# Patient Record
Sex: MALE | Race: WHITE
[De-identification: names, ages, dates, MRNs, and addresses within clinical notes are randomized per-mention and may not be internally consistent; named-entity substitution may affect disease eponyms.]

---

## 2023-04-16 VITALS — SYSTOLIC BLOOD PRESSURE: 119 MMHG | DIASTOLIC BLOOD PRESSURE: 57 MMHG

## 2023-04-17 ENCOUNTER — HOSPITAL ENCOUNTER (INPATIENT)
Dept: HOSPITAL 26 - MED | Age: 63
LOS: 3 days | Discharge: ANOTHER HOSPITAL | DRG: 291 | End: 2023-04-20
Attending: FAMILY MEDICINE | Admitting: FAMILY MEDICINE
Payer: MEDICARE

## 2023-04-17 VITALS — SYSTOLIC BLOOD PRESSURE: 143 MMHG | DIASTOLIC BLOOD PRESSURE: 76 MMHG

## 2023-04-17 VITALS — WEIGHT: 275 LBS | HEIGHT: 65 IN | BODY MASS INDEX: 45.82 KG/M2

## 2023-04-17 VITALS — DIASTOLIC BLOOD PRESSURE: 63 MMHG | SYSTOLIC BLOOD PRESSURE: 123 MMHG

## 2023-04-17 DIAGNOSIS — M10.9: ICD-10-CM

## 2023-04-17 DIAGNOSIS — J44.9: ICD-10-CM

## 2023-04-17 DIAGNOSIS — D72.0: ICD-10-CM

## 2023-04-17 DIAGNOSIS — E11.65: ICD-10-CM

## 2023-04-17 DIAGNOSIS — D72.829: ICD-10-CM

## 2023-04-17 DIAGNOSIS — Z20.822: ICD-10-CM

## 2023-04-17 DIAGNOSIS — E83.51: ICD-10-CM

## 2023-04-17 DIAGNOSIS — I50.33: ICD-10-CM

## 2023-04-17 DIAGNOSIS — J96.00: ICD-10-CM

## 2023-04-17 DIAGNOSIS — E83.39: ICD-10-CM

## 2023-04-17 DIAGNOSIS — E66.01: ICD-10-CM

## 2023-04-17 DIAGNOSIS — D63.1: ICD-10-CM

## 2023-04-17 DIAGNOSIS — E87.6: ICD-10-CM

## 2023-04-17 DIAGNOSIS — I13.2: Primary | ICD-10-CM

## 2023-04-17 DIAGNOSIS — I48.92: ICD-10-CM

## 2023-04-17 DIAGNOSIS — E87.1: ICD-10-CM

## 2023-04-17 DIAGNOSIS — Z91.040: ICD-10-CM

## 2023-04-17 DIAGNOSIS — I48.91: ICD-10-CM

## 2023-04-17 DIAGNOSIS — I25.10: ICD-10-CM

## 2023-04-17 DIAGNOSIS — Z88.6: ICD-10-CM

## 2023-04-17 DIAGNOSIS — E88.09: ICD-10-CM

## 2023-04-17 DIAGNOSIS — E11.22: ICD-10-CM

## 2023-04-17 DIAGNOSIS — N18.6: ICD-10-CM

## 2023-04-17 LAB
ALBUMIN FLD-MCNC: 2.5 G/DL (ref 3.4–5)
ANION GAP SERPL CALCULATED.3IONS-SCNC: 12.7 MMOL/L (ref 8–16)
AST SERPL-CCNC: 16 U/L (ref 15–37)
BASOPHILS # BLD AUTO: 0 K/UL (ref 0–0.22)
BASOPHILS NFR BLD AUTO: 0.3 % (ref 0–2)
BILIRUB SERPL-MCNC: 0.4 MG/DL (ref 0–1)
BUN SERPL-MCNC: 54 MG/DL (ref 7–18)
CHLORIDE SERPL-SCNC: 103 MMOL/L (ref 98–107)
CO2 SERPL-SCNC: 25.1 MMOL/L (ref 21–32)
CREAT SERPL-MCNC: 3 MG/DL (ref 0.6–1.3)
EOSINOPHIL # BLD AUTO: 0.2 K/UL (ref 0–0.4)
EOSINOPHIL NFR BLD AUTO: 2.4 % (ref 0–4)
ERYTHROCYTE [DISTWIDTH] IN BLOOD BY AUTOMATED COUNT: 17.9 % (ref 11.6–13.7)
GFR SERPL CREATININE-BSD FRML MDRD: 27 ML/MIN (ref 90–?)
GLUCOSE SERPL-MCNC: 89 MG/DL (ref 74–106)
HCT VFR BLD AUTO: 31.4 % (ref 36–52)
HGB BLD-MCNC: 10 G/DL (ref 12–18)
LYMPHOCYTES # BLD AUTO: 0.3 K/UL (ref 2–11.5)
LYMPHOCYTES NFR BLD AUTO: 3.1 % (ref 20.5–51.1)
MCH RBC QN AUTO: 30 PG (ref 27–31)
MCHC RBC AUTO-ENTMCNC: 32 G/DL (ref 33–37)
MCV RBC AUTO: 92.6 FL (ref 80–94)
MONOCYTES # BLD AUTO: 0.6 K/UL (ref 0.8–1)
MONOCYTES NFR BLD AUTO: 5.4 % (ref 1.7–9.3)
NEUTROPHILS # BLD AUTO: 9.1 K/UL (ref 1.8–7.7)
NEUTROPHILS NFR BLD AUTO: 88.8 % (ref 42.2–75.2)
PLATELET # BLD AUTO: 242 K/UL (ref 140–450)
POTASSIUM SERPL-SCNC: 2.8 MMOL/L (ref 3.5–5.1)
PROTHROMBIN TIME: 10.8 SECS (ref 10.8–13.4)
RBC # BLD AUTO: 3.39 MIL/UL (ref 4.2–6.1)
SODIUM SERPL-SCNC: 138 MMOL/L (ref 136–145)
WBC # BLD AUTO: 10.2 K/UL (ref 4.8–10.8)

## 2023-04-17 PROCEDURE — 05HY33Z INSERTION OF INFUSION DEVICE INTO UPPER VEIN, PERCUTANEOUS APPROACH: ICD-10-PCS | Performed by: STUDENT IN AN ORGANIZED HEALTH CARE EDUCATION/TRAINING PROGRAM

## 2023-04-17 NOTE — NUR
2100 DR APONTE ASKED ME TO TAKE PT OFF BIPAP. PLACED PT ON 2LNC. SATS DROPPED. 
PLACED PT ON 6L FACE MASK. SATS IMPROVED BUT PT STATED HE FELT SOB. DR APONTE 
SAID TO PLCE PT BACK ON BIPAP. SAME SETTINGS.

## 2023-04-17 NOTE — NUR
BED HAS NO WORKING SCALE


-------------------------------------------------------------------------------

Addendum: 04/18/23 at 0737 by Agency 08 RN RN

-------------------------------------------------------------------------------

Amended: Links added.

## 2023-04-17 NOTE — NUR
[facility] ER NURSE PER TRANSFER REPORT@ 2300 VIA PHONE. REPORTED:54 YO MALE WITH SOB DURING 
HEMODIALYSIS. DX: SOB AND PULMONARY EDEMA. HX: COPD, DM, HTN, KIDNEY DISEASE. ADMIT TO TELE 
PER MD [facility]. SINUS TACH 120'S -130'S, ASYMPTOMATIC, MD AWARE. BIPAP WITH ATTEMPT TO 
TRANSFER TO NASAL CANNULA 6L. PT DESATTED AND SOB. BIPAP MAINTAINED. SETTINGS: RATE 16, FI02 
45, IPAP 14, EPAP 7. HD MONDAY AND FRIDAY WITH RIGHT UPPER CHEST ACCESS. GENNA PICC PLACED, 
CONFIRMED AND SL. K=2.8  AND 1ST TROPONIN=25 AND 2ND TROP PENDING, MD AWARE. SKIN INTACT. 
NON PITTING EDEMA 2+ BILAT LOWER EXTREMITIES. DENIES PAIN. 

RECEIVED AT THIS TIME VIA GURNEY FROM ER WITH RESPIRATORY ACCOMPANYING ER TRANSPORT, BIPAP 
MAINTAINED. VS 97.1-65-/57 98% ON BIPAP. PT RECEIVED AS REPORTED. RELAXED, A/0X4. "CAN 
SOMEONE LET MY  KNOW I AM HERE." VERIFIED ALLERGIES, LATEX AND NSAIDS. CONT TO 
MONITOR.

## 2023-04-17 NOTE — NUR
231 TRANSPORTED PT ON BIPAP TO ROOM 118. PT IS ON IPAP 14 EPAP 7 RR 16 FIO2 45%. PT 
UNDERSTANDS TO KEEP MASK ON.

## 2023-04-18 VITALS — SYSTOLIC BLOOD PRESSURE: 106 MMHG | DIASTOLIC BLOOD PRESSURE: 67 MMHG

## 2023-04-18 VITALS — SYSTOLIC BLOOD PRESSURE: 105 MMHG | DIASTOLIC BLOOD PRESSURE: 45 MMHG

## 2023-04-18 VITALS — SYSTOLIC BLOOD PRESSURE: 128 MMHG | DIASTOLIC BLOOD PRESSURE: 62 MMHG

## 2023-04-18 VITALS — SYSTOLIC BLOOD PRESSURE: 115 MMHG | DIASTOLIC BLOOD PRESSURE: 56 MMHG

## 2023-04-18 VITALS — DIASTOLIC BLOOD PRESSURE: 59 MMHG | SYSTOLIC BLOOD PRESSURE: 130 MMHG

## 2023-04-18 LAB
AMYLASE SERPL-CCNC: 12 U/L (ref 25–115)
ANION GAP SERPL CALCULATED.3IONS-SCNC: 14.6 MMOL/L (ref 8–16)
BASOPHILS # BLD AUTO: 0 K/UL (ref 0–0.22)
BASOPHILS NFR BLD AUTO: 0.2 % (ref 0–2)
BUN SERPL-MCNC: 55 MG/DL (ref 7–18)
CHLORIDE SERPL-SCNC: 102 MMOL/L (ref 98–107)
CHOLEST/HDLC SERPL: 1.3 {RATIO} (ref 1–4.5)
CO2 SERPL-SCNC: 23.5 MMOL/L (ref 21–32)
CREAT SERPL-MCNC: 3.3 MG/DL (ref 0.6–1.3)
EOSINOPHIL # BLD AUTO: 0.3 K/UL (ref 0–0.4)
EOSINOPHIL NFR BLD AUTO: 1.9 % (ref 0–4)
ERYTHROCYTE [DISTWIDTH] IN BLOOD BY AUTOMATED COUNT: 17.6 % (ref 11.6–13.7)
GFR SERPL CREATININE-BSD FRML MDRD: 24 ML/MIN (ref 90–?)
GLUCOSE SERPL-MCNC: 151 MG/DL (ref 74–106)
HCT VFR BLD AUTO: 28.8 % (ref 36–52)
HDLC SERPL-MCNC: 55 MG/DL (ref 40–60)
HGB BLD-MCNC: 9.2 G/DL (ref 12–18)
LDLC SERPL CALC-MCNC: 10 MG/DL (ref 60–100)
LIPASE SERPL-CCNC: 90 U/L (ref 73–393)
LYMPHOCYTES # BLD AUTO: 1.3 K/UL (ref 2–11.5)
LYMPHOCYTES NFR BLD AUTO: 7.9 % (ref 20.5–51.1)
MAGNESIUM SERPL-MCNC: 1.9 MG/DL (ref 1.8–2.4)
MCH RBC QN AUTO: 30 PG (ref 27–31)
MCHC RBC AUTO-ENTMCNC: 32 G/DL (ref 33–37)
MCV RBC AUTO: 93.6 FL (ref 80–94)
MONOCYTES # BLD AUTO: 1.7 K/UL (ref 0.8–1)
MONOCYTES NFR BLD AUTO: 10.2 % (ref 1.7–9.3)
NEUTROPHILS # BLD AUTO: 13.5 K/UL (ref 1.8–7.7)
NEUTROPHILS NFR BLD AUTO: 79.8 % (ref 42.2–75.2)
PHOSPHATE SERPL-MCNC: 2 MG/DL (ref 2.5–4.9)
PLATELET # BLD AUTO: 237 K/UL (ref 140–450)
POTASSIUM SERPL-SCNC: 4.1 MMOL/L (ref 3.5–5.1)
RBC # BLD AUTO: 3.08 MIL/UL (ref 4.2–6.1)
SODIUM SERPL-SCNC: 136 MMOL/L (ref 136–145)
T4 FREE SERPL-MCNC: 1.18 NG/DL (ref 0.76–1.46)
TRIGL SERPL-MCNC: 36 MG/DL (ref 30–150)
TSH SERPL DL<=0.05 MIU/L-ACNC: 2.78 UIU/ML (ref 0.34–3.74)
WBC # BLD AUTO: 16.9 K/UL (ref 4.8–10.8)

## 2023-04-18 PROCEDURE — 5A09357 ASSISTANCE WITH RESPIRATORY VENTILATION, LESS THAN 24 CONSECUTIVE HOURS, CONTINUOUS POSITIVE AIRWAY PRESSURE: ICD-10-PCS | Performed by: STUDENT IN AN ORGANIZED HEALTH CARE EDUCATION/TRAINING PROGRAM

## 2023-04-18 PROCEDURE — 5A1D70Z PERFORMANCE OF URINARY FILTRATION, INTERMITTENT, LESS THAN 6 HOURS PER DAY: ICD-10-PCS | Performed by: STUDENT IN AN ORGANIZED HEALTH CARE EDUCATION/TRAINING PROGRAM

## 2023-04-18 RX ADMIN — POTASSIUM & SODIUM PHOSPHATES POWDER PACK 280-160-250 MG SCH PKT: 280-160-250 PACK at 13:29

## 2023-04-18 RX ADMIN — POTASSIUM & SODIUM PHOSPHATES POWDER PACK 280-160-250 MG SCH PKT: 280-160-250 PACK at 17:32

## 2023-04-18 RX ADMIN — PANTOPRAZOLE SODIUM SCH MG: 40 TABLET, DELAYED RELEASE ORAL at 09:29

## 2023-04-18 NOTE — NUR
SCHEDULED AND PRESCRIBED MEDICATION WAS GIVEN TO PT AS PER MD ORDER. ALL SAFETY PRECAUTION 
IMPLEMENTED. BED IN LOW POSITION, BED WHEELS ON LOCK AND CALL LIGHT WITHIN REACH.

## 2023-04-18 NOTE — NUR
RECEIVED PT FROM MORNING SHIFT NURSE. PT IS AOX4, AMBULATORY WITH WALKER AND ASSIST, ABLE TO 
VERBALIZE NEEDS AND ABLE TO FOLLOW COMMANDS. PT HAS 6L NC AND ON REGULAR DIET. PT HAS PIC 
LINE ON RIGHT UPPER ARM DOUBLE LUMEN SALINE LOCK AND PT HAS DIALYSIS ACCESS ON RIGHT UPPER 
CHEST. PT SKIN IS INTACT. NO COMPLAIN OF PAIN AT THIS TIME ON S/S OF RESPIRATORY DISTRESS 
NOTED. ALL SAFETY PRECAUTION IMPLEMENTED. BED IN LOW POSITION, BED WHEELS ON LOCK AND CALL 
LIGHT WITHIN REACH.

## 2023-04-18 NOTE — NUR
PT DONE WITH DIALYSIS, 2.1L OUT. PT VITALS STABLE, REPORTS FEELING BETTER. RT WILL BE 
CONTACTED TO ASSESS PT BREATHING IN HOPES TO CHANGE PT FROM BIPAP TO CPAP OR HIGHFLOW O2.

## 2023-04-18 NOTE — NUR
RECEIVED REPORT FROM NIGHTSHIFT NURSE. PT IS ASLEEP IN BED, WOKE TO NAME AND TOUCH. NO SIGNS 
OF DISTRESS, NO REPORTS OF PAIN/DISCOMFORT. PT ON BIPAP. DOUBLE LUMEN PICC LINE TO RIGHT 
UPPER ARM, DIALYSIS CATH IN RIGHT UPPER CHEST, BOTH CLEAN AND INTACT. NO FURTHER NEEDS ARE 
TO BE MET AT THIS TIME, WILL CONTINUE WITH PATIENT CARE.



REORIENTED PT TO CALL LIGHT. BED IN LOWEST POSITION, 2 SIDE RAILS UP, CALL LIGHT PLACED 
WITHIN REACH.

## 2023-04-18 NOTE — NUR
MD NOTIFIED OF CARDIAC RHYTHM CHANGE FROM ST TO UNCONTROLLED AFIB/AFLUTTER 107/120'S. 
REMAINS ASYMPTOMATIC. MRSA SCREEN COLLECTED.  PHONED AND SPOKE WITH PT UPON ARRIVAL 
TO FLOOR.

## 2023-04-18 NOTE — NUR
PT CHANGED FROM BIPAP TO NC @1530 WITH RT. 



ENDORSED PT TO NIGHTSHIFT NURSE FOR CONTINUITY OF CARE. PT IS AWAKE, STABLE, NO SIGNS OF 
DISTRESS, NO REPORTS OF PAIN.  AT BEDSIDE. NO FURTHER NEEDS ARE TO BE MET AT THIS 
TIME. BED IN LOWEST POSITION, 2 SIDE RAILS UP, CALL LIGHT PLACED WITHIN REACH.

## 2023-04-18 NOTE — NUR
RN CALLED STATED PT IS REQUESTING TO COME OFF BIPAP HE GOT DIALYSIS AND FEELS BETTER. RT 
ASSESSED PT AND PUT PT ON 6L  GREEN BUBBLER NC. HE IS HAVING NO DISTRESS NOTED. HR 87 SPO2 
95%. WILL CONTINUE TO MONITOR AND PT IS REQUESTING TO USE CPAP AT NIGHT LIKE HE DOES AT 
HOME.  CPAP OF 14. HE SAYS IT IS EASIER THAN BIPAP. WILL GIVE DURING REPORT.

## 2023-04-18 NOTE — NUR
PATIENT HAS BEEN SCREENED AND CATEGORIZED AS MODERATE NUTRITION RISK. PATIENT WILL BE SEEN 
WITHIN 3-5 DAYS OF ADMISSION.



4/21/23-4/23/23



REVIEWED BY DALE DRIVER RD


-------------------------------------------------------------------------------

Addendum: 04/18/23 at 1254 by Rolando Pino RD

-------------------------------------------------------------------------------

[facility] CONSULT RECEIVED ON 4/18/23 FOR UNCONTROLLED DIABETES, RENAL DIET . CONSULT DOES NOT 
MEET HIGH RISK CRITERIA PER HOSPITAL POLICY. PT WILL BE SEEN AND ASSESSED ACCORDING TO THE 
NUTRITION CARE POLICY.



REVIEWED BY MARANDA ROBLES RD

## 2023-04-18 NOTE — NUR
PLACED ON CPAP PER PT REQUEST, PT STATES HE USES A CPAP OF 14 AT HOME AT NIGHT. PT IS ON 
CPAP OF 14 AND 45% FIO2. NO DISTRESS NOTED, SPO2 99%, WILL CONTINUE TO MONITOR.

## 2023-04-18 NOTE — NUR
HAND-OFF REPORT TO JAIMIE DIAMOND AM ON-COMING NURSE FOR CONTINUITY OF CARE. PT REMAINS SAME. 
ON BIPAP WITH NO CHANGE IN SETTINGS. TOLERATING WELL. CONTINUES UNCONTROLLED AFIB/AFLUTTER 
ON CARDIAC MONITOR, ASYMPTOMATIC. NO RESPIRATORY DISTRESS. CONTINUES TO DENY PAIN OR ANY 
DISCOMFORT. GENNA PICC LINE DOUBLE LUMEN PATENT WITH A.M LABS BLOOD DRAW TAKEN FROM LINE. 
RELINQUISHED CARE OF PT AT THIS TIME.

## 2023-04-18 NOTE — NUR
DC PLANNING 



*** ASSESSMENT COMPLETE***



PLEASE REFER TO ASSESSMENT FOR ADDITIONAL DETAILS 





TENTATIVE DC PLAN IS FOR PT TO RETURN HOME, WITH PARTNER PROVIDING TRANSPORTATION, WHEN 
MEDICALLY STABLE. SW SPOKE TO PT ABOUT [facility] TRANSFER, HOWEVER, REPORTED THAT CM WOULD 
NOTIFY HIM OF OPEN BEDS OR WHEN [place] HAS ACCEPTED. PT VERBALIZED UNDERSTANDING AND REPORTS 
HE UNDERSTAND PROCESS. 

-------------------------------------------------------------------------------

Addendum: 04/19/23 at 0845 by Shikha DING

-------------------------------------------------------------------------------

Amended: Links added.

## 2023-04-18 NOTE — NUR
OBTAINED ORDER FROM MD FOR DIALYSIS TODAY. OBTAINED CONSENT FROM PT AND MD. PT WITH DIALYSIS 
NURSE AT BEDSIDE. VITAL SIGNS ARE STABLE, PT STARTED DIALYSIS @ 1226. WILL CONTINUE WITH 
CARE.

## 2023-04-19 VITALS — SYSTOLIC BLOOD PRESSURE: 163 MMHG | DIASTOLIC BLOOD PRESSURE: 80 MMHG

## 2023-04-19 VITALS — SYSTOLIC BLOOD PRESSURE: 125 MMHG | DIASTOLIC BLOOD PRESSURE: 50 MMHG

## 2023-04-19 VITALS — SYSTOLIC BLOOD PRESSURE: 103 MMHG | DIASTOLIC BLOOD PRESSURE: 46 MMHG

## 2023-04-19 VITALS — SYSTOLIC BLOOD PRESSURE: 126 MMHG | DIASTOLIC BLOOD PRESSURE: 69 MMHG

## 2023-04-19 VITALS — SYSTOLIC BLOOD PRESSURE: 130 MMHG | DIASTOLIC BLOOD PRESSURE: 59 MMHG

## 2023-04-19 LAB
ANION GAP SERPL CALCULATED.3IONS-SCNC: 13.3 MMOL/L (ref 8–16)
BASOPHILS # BLD AUTO: 0.1 K/UL (ref 0–0.22)
BASOPHILS NFR BLD AUTO: 0.6 % (ref 0–2)
BUN SERPL-MCNC: 47 MG/DL (ref 7–18)
CHLORIDE SERPL-SCNC: 100 MMOL/L (ref 98–107)
CO2 SERPL-SCNC: 23.7 MMOL/L (ref 21–32)
CREAT SERPL-MCNC: 3.6 MG/DL (ref 0.6–1.3)
EOSINOPHIL # BLD AUTO: 0.5 K/UL (ref 0–0.4)
EOSINOPHIL NFR BLD AUTO: 4.3 % (ref 0–4)
ERYTHROCYTE [DISTWIDTH] IN BLOOD BY AUTOMATED COUNT: 18.1 % (ref 11.6–13.7)
GFR SERPL CREATININE-BSD FRML MDRD: 22 ML/MIN (ref 90–?)
GLUCOSE SERPL-MCNC: 330 MG/DL (ref 74–106)
HCT VFR BLD AUTO: 27.6 % (ref 36–52)
HGB BLD-MCNC: 8.9 G/DL (ref 12–18)
LYMPHOCYTES # BLD AUTO: 1.2 K/UL (ref 2–11.5)
LYMPHOCYTES NFR BLD AUTO: 11.5 % (ref 20.5–51.1)
MCH RBC QN AUTO: 30 PG (ref 27–31)
MCHC RBC AUTO-ENTMCNC: 32 G/DL (ref 33–37)
MCV RBC AUTO: 93.4 FL (ref 80–94)
MONOCYTES # BLD AUTO: 1.1 K/UL (ref 0.8–1)
MONOCYTES NFR BLD AUTO: 10.8 % (ref 1.7–9.3)
NEUTROPHILS # BLD AUTO: 7.7 K/UL (ref 1.8–7.7)
NEUTROPHILS NFR BLD AUTO: 72.8 % (ref 42.2–75.2)
PLATELET # BLD AUTO: 263 K/UL (ref 140–450)
POTASSIUM SERPL-SCNC: 4 MMOL/L (ref 3.5–5.1)
RBC # BLD AUTO: 2.95 MIL/UL (ref 4.2–6.1)
SODIUM SERPL-SCNC: 133 MMOL/L (ref 136–145)
T3RU NFR SERPL: 30 % (ref 24–39)
T4 SERPL-MCNC: 7.5 UG/DL (ref 4.5–12)
WBC # BLD AUTO: 10.6 K/UL (ref 4.8–10.8)

## 2023-04-19 RX ADMIN — POTASSIUM & SODIUM PHOSPHATES POWDER PACK 280-160-250 MG SCH PKT: 280-160-250 PACK at 17:44

## 2023-04-19 RX ADMIN — POTASSIUM & SODIUM PHOSPHATES POWDER PACK 280-160-250 MG SCH PKT: 280-160-250 PACK at 09:48

## 2023-04-19 RX ADMIN — INSULIN LISPRO PRN UNITS: 100 INJECTION, SOLUTION INTRAVENOUS; SUBCUTANEOUS at 17:45

## 2023-04-19 RX ADMIN — PANTOPRAZOLE SODIUM SCH MG: 40 TABLET, DELAYED RELEASE ORAL at 09:47

## 2023-04-19 RX ADMIN — INSULIN LISPRO PRN UNITS: 100 INJECTION, SOLUTION INTRAVENOUS; SUBCUTANEOUS at 13:29

## 2023-04-19 RX ADMIN — ASPIRIN TAB DELAYED RELEASE 81 MG SCH MG: 81 TABLET DELAYED RESPONSE at 09:48

## 2023-04-19 RX ADMIN — POTASSIUM & SODIUM PHOSPHATES POWDER PACK 280-160-250 MG SCH PKT: 280-160-250 PACK at 13:26

## 2023-04-19 RX ADMIN — Medication SCH MG: at 17:43

## 2023-04-19 RX ADMIN — Medication SCH DEV: at 16:30

## 2023-04-19 RX ADMIN — Medication SCH DEV: at 11:30

## 2023-04-19 RX ADMIN — ATORVASTATIN CALCIUM SCH MG: 80 TABLET, FILM COATED ORAL at 09:48

## 2023-04-19 RX ADMIN — Medication SCH MG: at 09:50

## 2023-04-19 RX ADMIN — Medication SCH DEV: at 21:00

## 2023-04-19 NOTE — NUR
DC PLANNING:

CM CALLED [place]  SPOKE WITH LARRY STATED THEY RECEIVED THE ORDER FOR STABLE 
FOR TRANSFER AND JAIMIE IS THE CM CURRENTLY WORKING ON TRANSFERRING TO [facility]. CM 
TO FOLLOW

## 2023-04-19 NOTE — NUR
CHECKED THE PT, STILL ON SLEEP. CHEST RISE AND FALL SYMMETRICALLY NOTED. RESPIRATION IS EVEN 
AND UNLABORED. ALL SAFETY PRECAUTION IMPLEMENTED. BED IN LOW POSITION, BED WHEELS ON LOCK 
AND CALL LIGHT WITHIN REACH.

## 2023-04-19 NOTE — NUR
NURSING PM NARRATIVE (OPENING)

1930 HAND-OFF REPORT RECEIVED FROM PRABHAKAR AIKEN. ENDORSED: NO HD TODAY. CATHETER 
CLOGGED. ALTEPASE GIVEN BY HD NURSE. TRY AGAIN TO ACCESS TOMORROW. PT RECEIVED RESTINIG IN 
BED WITHOUT C/O. O2 6L/NC AND MAY HAVE CPAP AT NIGHT PRN. POSS D/C TOMORROW. DELIO PT.

PLAN: MEDS AS SCHEDULED AND COMFORT MEASURES.

## 2023-04-19 NOTE — NUR
RECEIVED ON SUPPLEMENTAL OXYGEN AT 6 LPM VIA CURAPLEX NC/BUBBLE HUMIDIFIER SATURATION 100%; 
TITRATED FIO2 TO 4 LPM; PRABHAKAR/BARRY RN AT BEDSIDE AND AWARE; RCP TO MONITOR AND TITRATED 
FIO2 AS TOLERATED

## 2023-04-19 NOTE — NUR
RECEIVED REPORT FROM NIGHT SHIFT NURSE. PATIENT LYING DOWN IN BED, ON CPAP MACHINE, 
SLEEPING, AROUSABLE BY VOICE. NO DISTRESS NOTED. GENNA PICC LINE INTACT, ON SALINE LOCK. SKIN 
INTACT. REVIEWED PLAN OF CARE WITH PATIENT. VERBALIZED UNDERSTANDING. SAFETY MEASURES IN 
PLACE, CALL LIGHT WITHIN REACH. WILL CONTINUE TO MONITOR.

## 2023-04-19 NOTE — NUR
REMOVED FROM CPAP TO MASK AND PLACED ON SUPPLEMENTAL OXYGEN AT 6 LPM VIA NC BY PRABHAKAR/BARRY DIAMOND

## 2023-04-19 NOTE — NUR
PT IS ON SLEEP. CHEST RISE AND FALL SYMMETRICALLY NOTED. RESPIRATION IS EVEN AND UNLABORED. 
ALL SAFETY AND SEIZURE PRECAUTION IMPLEMENTED. BED IN LOW POSITION, BED WHEELS ON LOCK AND 
CALL LIGHT WITHIN REACH.

## 2023-04-19 NOTE — NUR
HD NURSE AT BEDSIDE, SAYS "RIGHT TUNNEL CATHETER IS CLOGGED." HD NURSE TALKED TO DR. LOPES, PER MD GIVE CATH FLOW X2 TO EACH LUMEN CATHETER. CATH FLOW GIVEN AS PER MD ORDER. 
HD NURSE WILL COME BACK TOMORROW TO REASSESS THE HD SITE.

## 2023-04-19 NOTE — NUR
PT WAS GIVEN WARM BLANKET AS PER PT REQUEST. PT DENIES PAIN. NO S/S OF RESPIRATORY DISTRESS 
NOTED. ALL SAFETY MEASURES IMPLEMENTED. BED IN LOW POSITION, BED WHEELS ON LOCK AND CALL 
LIGHT WITHIN REACH.

## 2023-04-20 VITALS — DIASTOLIC BLOOD PRESSURE: 98 MMHG | SYSTOLIC BLOOD PRESSURE: 138 MMHG

## 2023-04-20 VITALS — SYSTOLIC BLOOD PRESSURE: 144 MMHG | DIASTOLIC BLOOD PRESSURE: 64 MMHG

## 2023-04-20 VITALS — DIASTOLIC BLOOD PRESSURE: 64 MMHG | SYSTOLIC BLOOD PRESSURE: 144 MMHG

## 2023-04-20 VITALS — SYSTOLIC BLOOD PRESSURE: 123 MMHG | DIASTOLIC BLOOD PRESSURE: 81 MMHG

## 2023-04-20 VITALS — SYSTOLIC BLOOD PRESSURE: 163 MMHG | DIASTOLIC BLOOD PRESSURE: 80 MMHG

## 2023-04-20 VITALS — DIASTOLIC BLOOD PRESSURE: 71 MMHG | SYSTOLIC BLOOD PRESSURE: 119 MMHG

## 2023-04-20 VITALS — SYSTOLIC BLOOD PRESSURE: 145 MMHG | DIASTOLIC BLOOD PRESSURE: 66 MMHG

## 2023-04-20 VITALS — DIASTOLIC BLOOD PRESSURE: 57 MMHG | SYSTOLIC BLOOD PRESSURE: 126 MMHG

## 2023-04-20 LAB
ANION GAP SERPL CALCULATED.3IONS-SCNC: 14.8 MMOL/L (ref 8–16)
APPEARANCE UR: CLEAR
BARBITURATES UR QL SCN: NEGATIVE NG/ML
BASOPHILS # BLD AUTO: 0.1 K/UL (ref 0–0.22)
BASOPHILS NFR BLD AUTO: 0.5 % (ref 0–2)
BENZODIAZ UR QL SCN: NEGATIVE NG/ML
BILIRUB UR QL STRIP: (no result)
BUN SERPL-MCNC: 59 MG/DL (ref 7–18)
BZE UR QL SCN: NEGATIVE NG/ML
CANNABINOIDS UR QL SCN: NEGATIVE NG/ML
CHLORIDE SERPL-SCNC: 98 MMOL/L (ref 98–107)
CO2 SERPL-SCNC: 22.9 MMOL/L (ref 21–32)
COLOR UR: YELLOW
CREAT SERPL-MCNC: 3.8 MG/DL (ref 0.6–1.3)
EOSINOPHIL # BLD AUTO: 0.4 K/UL (ref 0–0.4)
EOSINOPHIL NFR BLD AUTO: 4.2 % (ref 0–4)
ERYTHROCYTE [DISTWIDTH] IN BLOOD BY AUTOMATED COUNT: 17.7 % (ref 11.6–13.7)
GFR SERPL CREATININE-BSD FRML MDRD: 21 ML/MIN (ref 90–?)
GLUCOSE SERPL-MCNC: 294 MG/DL (ref 74–106)
GLUCOSE UR STRIP-MCNC: (no result) MG/DL
HCT VFR BLD AUTO: 26.8 % (ref 36–52)
HGB BLD-MCNC: 8.8 G/DL (ref 12–18)
HGB UR QL STRIP: NEGATIVE
LEUKOCYTE ESTERASE UR QL STRIP: NEGATIVE
LYMPHOCYTES # BLD AUTO: 1.2 K/UL (ref 2–11.5)
LYMPHOCYTES NFR BLD AUTO: 11.5 % (ref 20.5–51.1)
MCH RBC QN AUTO: 30 PG (ref 27–31)
MCHC RBC AUTO-ENTMCNC: 33 G/DL (ref 33–37)
MCV RBC AUTO: 91.7 FL (ref 80–94)
MONOCYTES # BLD AUTO: 0.9 K/UL (ref 0.8–1)
MONOCYTES NFR BLD AUTO: 9.4 % (ref 1.7–9.3)
NEUTROPHILS # BLD AUTO: 7.5 K/UL (ref 1.8–7.7)
NEUTROPHILS NFR BLD AUTO: 74.4 % (ref 42.2–75.2)
NITRITE UR QL STRIP: NEGATIVE
OPIATES UR QL SCN: POSITIVE NG/ML
PCP UR QL SCN: NEGATIVE NG/ML
PH UR STRIP: 5 [PH] (ref 5–9)
PLATELET # BLD AUTO: 269 K/UL (ref 140–450)
POTASSIUM SERPL-SCNC: 3.7 MMOL/L (ref 3.5–5.1)
RBC # BLD AUTO: 2.92 MIL/UL (ref 4.2–6.1)
SODIUM SERPL-SCNC: 132 MMOL/L (ref 136–145)
WBC # BLD AUTO: 10 K/UL (ref 4.8–10.8)

## 2023-04-20 PROCEDURE — 5A1D70Z PERFORMANCE OF URINARY FILTRATION, INTERMITTENT, LESS THAN 6 HOURS PER DAY: ICD-10-PCS | Performed by: STUDENT IN AN ORGANIZED HEALTH CARE EDUCATION/TRAINING PROGRAM

## 2023-04-20 RX ADMIN — Medication SCH DEV: at 07:46

## 2023-04-20 RX ADMIN — PANTOPRAZOLE SODIUM SCH MG: 40 TABLET, DELAYED RELEASE ORAL at 09:13

## 2023-04-20 RX ADMIN — ATORVASTATIN CALCIUM SCH MG: 80 TABLET, FILM COATED ORAL at 09:13

## 2023-04-20 RX ADMIN — Medication SCH DEV: at 12:04

## 2023-04-20 RX ADMIN — Medication SCH MG: at 17:18

## 2023-04-20 RX ADMIN — Medication SCH DEV: at 17:15

## 2023-04-20 RX ADMIN — INSULIN LISPRO PRN UNITS: 100 INJECTION, SOLUTION INTRAVENOUS; SUBCUTANEOUS at 12:06

## 2023-04-20 RX ADMIN — INSULIN LISPRO PRN UNITS: 100 INJECTION, SOLUTION INTRAVENOUS; SUBCUTANEOUS at 00:30

## 2023-04-20 RX ADMIN — INSULIN LISPRO PRN UNITS: 100 INJECTION, SOLUTION INTRAVENOUS; SUBCUTANEOUS at 07:54

## 2023-04-20 RX ADMIN — Medication SCH MG: at 09:14

## 2023-04-20 RX ADMIN — INSULIN LISPRO PRN UNITS: 100 INJECTION, SOLUTION INTRAVENOUS; SUBCUTANEOUS at 20:09

## 2023-04-20 RX ADMIN — INSULIN LISPRO PRN UNITS: 100 INJECTION, SOLUTION INTRAVENOUS; SUBCUTANEOUS at 17:16

## 2023-04-20 RX ADMIN — ASPIRIN TAB DELAYED RELEASE 81 MG SCH MG: 81 TABLET DELAYED RESPONSE at 09:13

## 2023-04-20 RX ADMIN — Medication SCH DEV: at 20:08

## 2023-04-20 RX ADMIN — POTASSIUM & SODIUM PHOSPHATES POWDER PACK 280-160-250 MG SCH PKT: 280-160-250 PACK at 09:14

## 2023-04-20 NOTE — NUR
4/20/23 RD INITIAL ASSESSMENT COMPLETED



PLEASE REFER TO NUTRITION ASSESSMENT UNDER CARE ACTIVITY FOR ESTIMATED NUTRITIONAL NEEDS. 



1. CONTINUE [id number], RENAL DIET AS TOLERATED 

2. PROVIDED NUTRITION EDUCATION AND HANDOUTS FOR CARB COUNTING AND RENAL DIET

3. RD TO FOLLOW-UP 7 DAYS, LOW RISK 



REVIEWED BY DALE DRIVER RD

## 2023-04-20 NOTE — NUR
RECEIVED PT SITTING ON A CHAIR. AWAKE, ALERT AND ORIENTED X 4 DENIES PAIN. NO ACUTE 
RESPIRATORY DISTRESS. SKIN WARM AND DRY TO TOUCH. SAFETY PRECAUTIONS IN PLACE, CALL LIGHT IN 
REACH.

## 2023-04-20 NOTE — NUR
RECEIVED REPORT FORM NIGHT NURSE ERICK RN FOR CONTINUITY OF CARE. INITIAL ASSESSMENT DONE. 
ALERT AND OIRENTED X 4. RESP. EVEN AND UNLABORED. ON CONT. O2 @ 6L/NC. PICC LINE INTACT TO 
GENNA. HEMODIALYSIS INTACT TO RT CHEST TUNNEL CATHETER. NOT IN ANY DISTRESS NOTED. CALL LIGHT 
KEPT WITHIN REACH. WILL CONTINUE TO MONITOR.

## 2023-04-20 NOTE — NUR
RN CALLED STATED PT TOOK OFF CPAP AND IS ON 4LNC . SATING 96%. WILL CONTINUE TO MONITOR. NO 
DISTRESS.

## 2023-04-20 NOTE — NUR
DISCHARGED PT TO [place]. REPORT GIVEN TO PARAMEDICS. ALL BELONGINGS WITH PT. AMBULATED TO 
THE [facility]. PT IS STABLE. DENIES PAIN.

## 2023-04-20 NOTE — NUR
0730 PM NURSING NARRATIVE (CLOSING)

HAND-OFF REPORT TO ROSALVA DIAMOND ON-COMING NURSE FOR CONTINUITY OF CARE AND BEDSIDE ROUNDS 
FOLLOWING WITH INTRODUCTION OF NEW NURSE TO PT. PT CONTINUES WITH BIPAP MASK ON. ENDORSED: 
CARDIAC RHYTHM CONVERTED BACK TO ATRIAL FLUTTER FROM ST/SR @ APPROX 0600. ENDORSED ALTEPASE 
GIVEN YESTERDAY @ 1730 FOR CLOTTED HD ACCESS CATHETER TO RIGHT CHEST. WILL ATTEMPT DIALYSIS 
AGAIN TODAY PENDING EFFECTIVENESS OF CLOT BLASTER. ASYMPTOMATIC ON BIPAP. POSSIBLE D/C 
TODAY. PT IS A [place] PT. PT CONTINUES IN NO ACUTE DISTRESS. RESTING. RELINQUISHED CARE OF 
PT.